# Patient Record
Sex: FEMALE | Race: WHITE | Employment: UNEMPLOYED | URBAN - METROPOLITAN AREA
[De-identification: names, ages, dates, MRNs, and addresses within clinical notes are randomized per-mention and may not be internally consistent; named-entity substitution may affect disease eponyms.]

---
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
## 2025-01-19 DIAGNOSIS — F33.41 MAJOR DEPRESSIVE DISORDER, RECURRENT, IN PARTIAL REMISSION: ICD-10-CM

## 2025-01-20 RX ORDER — SERTRALINE HYDROCHLORIDE 100 MG/1
100 TABLET, FILM COATED ORAL DAILY
Qty: 30 TABLET | Refills: 0 | Status: SHIPPED | OUTPATIENT
Start: 2025-01-20

## 2025-01-20 NOTE — TELEPHONE ENCOUNTER
"Date of Last Office Visit: 7/31/24  RTC: thanksgiving  No shows: 0  Cancellations: 0  Date of Next Office Visit: 0  ------------------------------    Medication requested:    sertraline (ZOLOFT) 100 MG tablet 90 tablet 0 10/28/2024     sertraline (ZOLOFT) 50 MG tablet 90 tablet 0 10/28/2024     ------------------------------  From last visit note:   \"Continue sertraline 150 mg to target anxiety and depression. \"    Lapse in medication adherence greater than 5 days?:  no  Medication refill request verified as identical to current order?: yes    Refill Decision:  30 day catrachito refill sent to the pharmacy - including instructions for patient to call the clinic and schedule an appointment.  Scheduling has been notified to contact the pt for appointment.    '        "

## 2025-01-26 NOTE — PROGRESS NOTES
Virtual Visit Details    Type of service:  Video Visit     Originating Location (pt. Location): Home    Distant Location (provider location):  On-site  Platform used for Video Visit: Innography    Video Start Time:  102pm    Video End Time: 134 pm    Documentation and script writin min    55 min spent on the date of the encounter in chart review, patient visit, review of tests, documentation, care coordination, and/or discussion with other providers about the issues documented above.        Psychiatry Clinic Progress Note                                                                   Ghada Cohen is a 19 year old female.  Therapist: Marilou Womack PhD  PCP: Gustabo Barber  Other Providers: None    Pertinent Background:  See previous notes.  Psych critical item history includes SIB, hospitalization X1, suicidal ideation.     Interim History                                                                                                        4, 4   Last seen on 24      Psychiatric meds:  Sertraline 150 mg/day  Benadryl 50 mg q hs  Hydroxyzine 5-10 mg prn anxiety    Ghada is a freshman at [employer]. She is on the diving team.  She is doing great.  She loves her school, had made friends who are both on and off the diving team.  She gets along very well with her roommate.    Mother reports that she has observed Ghada's mood is be good.  She is sleeping from 1030 pm to 530 am or 830 am (depending on whether she has diving practice). Her appetite , energy and concentration have been good.  She has not reported any suicidal ideation.    Anxiety under good control with sertraline.  Ghada has fear of flying that is managed with 10 mg hydroxyzine.    Diving has been enjoyable.  It is a big time commitment with 9 practices per week during the dive season.    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    All family members were ill with  "norovirus during winter break trip to [place].      Medical: Ghada receives infusions of saline 2 X/month for POTS at local hospital.  She takes 2 medications for POTS (metoprolol 37.5 mg, and midodrine 5 mg BID from Dr. Odell at [facility] cardiology).  This treatment plan has been helpful.    SEs:  None reported    Recent Symptoms:   Elevated:  none  Psychosis:  none   Anxiety: HPI  Depression: none     Recent Substance Use:  none reported        Social/ Family History                                  [per patient report]                                 1ea,1ea   Lives with parents and 2 younger sisters, great student.  Gets all As.   She loves alpine skiing.  Ghada loves animals and enjoys taking care of her pets.   Activities have included boxing, cello, biking. Parents are very supportive.       Medical / Surgical History                                                                                                                  Patient Active Problem List   Diagnosis    POTS (postural orthostatic tachycardia syndrome)       No past surgical history on file.     Medical Review of Systems                                                                                                    2,10   Comprehensive medical ROS was performed and was negative with the exception of what is in HPI.  Allergy                                Patient has no known allergies.  Current Medications                                                                                                       Current Outpatient Medications   Medication Sig Dispense Refill    diphenhydrAMINE (BENADRYL) 50 MG capsule Take 50 mg by mouth At Bedtime      Emergency Supply Kit, PIV, Patient use for emergency only. Contents: 3 sodium chloride 0.9% flushes, 1 IV start kit, 1 microclave ext set 14\", 1 each IV Cath 22 G/1\" and 24G/3/4\", 6 alcohol prep pads, 4 nitrile gloves (med). Call [phone] to reorder. 770213 kit 0    hydrOXYzine HCl " (ATARAX) 10 MG tablet Take 1-2 tablets (10-20 mg) by mouth daily as needed for anxiety 40 tablet 0    lactated ringers in 1,000 mL via gravity infusion Infuse over 2-4 hours into the vein once a week. Infuse 1-2 bag(s) (5421-9836 mL). Each bag to infuse over 2-4 hours. 886621 mL 0    melatonin 3 MG tablet Take 1 tab (3 mg) by mouth at bedtime 60 tablet 0    metoprolol succinate ER (TOPROL XL) 25 MG 24 hr tablet Take 1.5 tablets (37.5 mg) by mouth daily. 45 tablet 0    metoprolol succinate ER (TOPROL XL) 25 MG 24 hr tablet Take 1.5 tablets (37.5 mg) by mouth daily. 135 tablet 0    midodrine (PROAMATINE) 2.5 MG tablet Take 2 tablets (5 mg) by mouth 2 times daily. 360 tablet 1    MARY ANNE 0.25-35 MG-MCG tablet Take 1 tablet by mouth daily at 2 pm      sertraline (ZOLOFT) 100 MG tablet Take 1 tablet (100 mg) by mouth daily. WITH ONE 50 MG TAB FOR TOTAL DAILY DOSE  MG For more refills,schedule an appointment at [phone] 30 tablet 0    sertraline (ZOLOFT) 50 MG tablet Take 1 tablet (50 mg) by mouth daily. WITH  MG TAB FOR TOTAL DOSE  MG For more refills,schedule an appointment at [phone] 30 tablet 0    sodium chloride, PF, 0.9% PF flush Inject 10 mLs into the vein as needed for line flush. Flush IV before and after medication administration as directed and/or at least every 12 hours. [id number] mL 0     Vitals                                                                                                                       3, 3   There were no vitals taken for this visit.   Mental Status Exam                                                                                    9, 14 cog gs   NA   Alertness: alert  and oriented  Appearance: well groomed    Behavior/Demeanor: cooperative and pleasant, with good  eye contact   Speech: normal  Language: intact  Psychomotor: normal or unremarkable  Mood: depressed  Affect: full range; was congruent to mood; was congruent to content  Thought  Process/Associations: unremarkable  Thought Content:  Denies SI    Perception:  Reports none;    Insight: good  Judgment: good  Cognition: (6) does  appear grossly intact; formal cognitive testing was not done  Gait/Station and/or Muscle Strength/Tone: unremarkable    Labs and Data                                                                                                                 Rating Scales:    N/A    Diagnosis and Assessment                                                                             m2, h3   Ghada has made an excellent adjustment to college.  Today the following issues were addressed:    1) MDD  Recurrent in remission   2) JESUSITA with panic attacks in remission  3) POTS - followed by Dr. Odell in peds cardiology    [place] Prescription Monitoring Program [] review was not needed today.        Plan                                                                                                                    m2, h3      1) & 2) Continue sertraline 150 mg to target anxiety and depression.    Hydroxyzine 5-10 mg PRN anxiety - takes this ~once a mon      RTC: summer 2025    CRISIS NUMBERS:   Provided routinely in AVS.    Treatment Risk Statement:  The patient understands the risks, benefits, adverse effects and alternatives. Agrees to treatment with t      The longitudinal plan of care for the diagnosis(es)/condition(s) as documented were addressed during this visit. Due to the added complexity in care, I will continue to support Ghada in the subsequent management and with ongoing continuity of care.          Psychiatry Clinic Individual Psychotherapy Note                                                                     [16]     Reviewed the treatment plan with Ghada and her father who agree with the plan on 3/29/23.    Start time -   NA  End time - NA  Date last reviewed - 3/29/23      Date next due - 4/29/23    Subjective: This supportive psychotherapy session addressed issues related  to assessment of symptoms and functioning, review of recent medical issues and plans for college.  Patient's reaction: good in the context of mental status appropriate for ambulatory setting.  Progress: good  Psychotherapy services during this visit included myself and the patient.   Treatment Plan      SYMPTOMS; PROBLEMS   MEASURABLE GOALS;    FUNCTIONAL IMPROVEMENT INTERVENTIONS;   GAINS MADE DISCHARGE CRITERIA   Anxiety: excessive worry   reduce anxiety symptoms medications - sertraline  monitoring of anxiety  psycho-education  marked symptom improvement   Depression: depressed mood, low energy, insomnia and irritability   Maintain remission of depression symptoms. medications sertraline  monitoring of depressive symptoms  psycho-education  marked symptom improvement     PROVIDER:  Nataly Machado MD

## 2025-01-27 ENCOUNTER — VIRTUAL VISIT (OUTPATIENT)
Dept: PSYCHIATRY | Facility: CLINIC | Age: 20
End: 2025-01-27
Payer: COMMERCIAL

## 2025-01-27 VITALS — WEIGHT: 150 LBS | BODY MASS INDEX: 23.54 KG/M2 | HEIGHT: 67 IN

## 2025-01-27 DIAGNOSIS — F41.1 GENERALIZED ANXIETY DISORDER: ICD-10-CM

## 2025-01-27 DIAGNOSIS — F33.42 MAJOR DEPRESSIVE DISORDER, RECURRENT, IN FULL REMISSION: Primary | ICD-10-CM

## 2025-01-27 DIAGNOSIS — Z71.0 COUNSELING FOR CONCERN ABOUT BEHAVIOR OF CHILD: ICD-10-CM

## 2025-01-27 RX ORDER — SERTRALINE HYDROCHLORIDE 100 MG/1
100 TABLET, FILM COATED ORAL DAILY
Qty: 90 TABLET | Refills: 1 | Status: SHIPPED | OUTPATIENT
Start: 2025-01-27

## 2025-01-27 ASSESSMENT — PAIN SCALES - GENERAL: PAINLEVEL_OUTOF10: NO PAIN (0)

## 2025-01-27 NOTE — NURSING NOTE
Current patient location:  at School    Is the patient currently in the state of [place]? NO    Visit mode: VIDEO    If the visit is dropped, the patient can be reconnected by:VIDEO VISIT: Text to cell phone:   Telephone Information:   Mobile [phone]       Will anyone else be joining the visit? NO  (If patient encounters technical issues they should call [phone] :[id number])    Are changes needed to the allergy or medication list? No    Are refills needed on medications prescribed by this physician? YES    Rooming Documentation:  Questionnaire(s) completed    Reason for visit: RECHECK    Breanna SALDANA

## 2025-03-12 ENCOUNTER — VIRTUAL VISIT (OUTPATIENT)
Dept: PEDIATRIC CARDIOLOGY | Facility: CLINIC | Age: 20
End: 2025-03-12
Payer: COMMERCIAL

## 2025-03-12 VITALS — WEIGHT: 150 LBS | BODY MASS INDEX: 23.54 KG/M2 | HEIGHT: 67 IN

## 2025-03-12 DIAGNOSIS — G90.A POTS (POSTURAL ORTHOSTATIC TACHYCARDIA SYNDROME): Primary | ICD-10-CM

## 2025-03-12 PROCEDURE — 1126F AMNT PAIN NOTED NONE PRSNT: CPT | Mod: 95 | Performed by: PEDIATRICS

## 2025-03-12 PROCEDURE — 98006 SYNCH AUDIO-VIDEO EST MOD 30: CPT | Performed by: PEDIATRICS

## 2025-03-12 ASSESSMENT — PAIN SCALES - GENERAL: PAINLEVEL_OUTOF10: NO PAIN (0)

## 2025-03-12 ASSESSMENT — PATIENT HEALTH QUESTIONNAIRE - PHQ9: SUM OF ALL RESPONSES TO PHQ QUESTIONS 1-9: 0

## 2025-03-12 NOTE — PATIENT INSTRUCTIONS
[facility]   Pediatric Specialty Clinic [place]      Pediatric Call Center Scheduling and Nurse Questions:  [phone]    After hours urgent matters that cannot wait until the next business day:  [phone].  Ask for the on-call pediatric doctor for the specialty you are calling for be paged.      Prescription Renewals:  Please call your pharmacy first.  Your pharmacy must fax requests to [phone].  Please allow 2-3 days for prescriptions to be authorized.    If your physician has ordered a CT or MRI, you may schedule this test by calling [facility] in [place] at [phone].        **If your child is having a sedated procedure, they will need a history and physical done at their Primary Care Provider within 30 days of the procedure.  If your child was seen by the ordering provider in our office within 30 days of the procedure, their visit summary will work for the H&P unless they inform you otherwise.  If you have any questions, please call the RN Care Coordinator.**

## 2025-03-12 NOTE — NURSING NOTE
Ghada Cohen complains of  POTS    Patient would like the video invitation sent by: Text to cell phone: [phone]     Patient is located in [place]? Yes     I have reviewed and updated the patient's medication list, allergies and preferred pharmacy.      Henna Bagley LPN

## 2025-03-12 NOTE — Clinical Note
3/12/2025      RE: Ghada Cohen  [address] [address]     Dear Colleague,    Thank you for the opportunity to participate in the care of your patient, Ghada Cohen, at the [facility]. Please see a copy of my visit note below.    No notes on file    Please do not hesitate to contact me if you have any questions/concerns.     Sincerely,       Bautista Odell MD

## 2025-03-12 NOTE — PROGRESS NOTES
"Pediatric Cardiology Virtual Visit    Patient:  Ghada Cohen MRN:  [id number]   YOB: 2005 Age:  19 year old   Date of Visit:  3/12/2025 PCP:  Gustabo Barber MD     Dear Dr. Barber:    I had the pleasure of seeing Ghada Cohen by virtual visit from the [facility] in [place] on 3/12/2025 in ongoing consultation for POTS. Today's history obtained from Ghada. I last saw her in 11/2024.    Increased metoprolol XR to 50mg -- this helped a lot for overall \"feeling good.\" Still getting IV fluids about every 2-3 weeks, feels it really wear off around the 2-week anita. Midodrine 2-3 times/day, notices when it wears off in the afternoon.     Doing \"actually pretty good.\" Good energy level. Sleep consistently ~9 hours uninterrupted. Dive season just finished, able to participate the whole season. Able to keep up exercise most days after season ended.     Main residual symptom is nausea, sometimes to the point where she vomits, perhaps every other week. Tends to be an hour after eating, more towards the end of the day. More associated with standing than supine.     Past medical history: No past medical history on file. As above. I reviewed Ghada Cohen's medical records.    She has a current medication list which includes the following prescription(s): diphenhydramine, emergency supply kit (piv), hydroxyzine hcl, lactated ringers in 1,000 mL via gravity infusion, melatonin, metoprolol succinate er, midodrine, cony, sertraline, sertraline, sodium chloride (pf), and metoprolol succinate er. She has No Known Allergies.    Family and Social History:  unchanged    The Review of Systems is negative other than noted in the HPI.    Physical Examination:  Ht 1.702 m (5' 7\")   Wt 68 kg (150 lb)   BMI 23.49 kg/m    GENERAL: alert and no distress  EYES: Eyes grossly normal to inspection.  No discharge or erythema, or obvious scleral/conjunctival " abnormalities.  RESP: No audible wheeze, cough, or visible cyanosis.    SKIN: Visible skin clear. No significant rash, abnormal pigmentation or lesions.  NEURO: Cranial nerves grossly intact.  Mentation and speech appropriate for age.  PSYCH: Appropriate affect, tone, and pace of words      Assessment and Plan: Ghada is a 19 year old female with POTS, under improved control of symptoms. No changes. Reviewed my low concern for MCAS. Reviewed other options besides prn ondansetron for episodic nausea, including cj and median nerve neuromodulation strategies. Follow-up 8/2025 with no tests.    Thank you for the opportunity to follow Ghada with you. Please don't hesitate to contact me with questions or concerns.    Bautista Odell MD  Pediatric Cardiology  [facility] [place] [address]  Phone [phone]  Fax [phone]    Total Time: 25min  This includes time spent in review of records and results, obtaining direct clinical information, counseling, and coordination of care for today's office visit.    Prescription drug management

## 2025-03-13 ENCOUNTER — HOME INFUSION (OUTPATIENT)
Dept: HOME HEALTH SERVICES | Facility: HOME HEALTH | Age: 20
End: 2025-03-13
Payer: COMMERCIAL

## 2025-03-18 ENCOUNTER — HOME INFUSION BILLING (OUTPATIENT)
Dept: HOME HEALTH SERVICES | Facility: HOME HEALTH | Age: 20
End: 2025-03-18
Payer: COMMERCIAL

## 2025-03-18 PROCEDURE — S1015 IV TUBING EXTENSION SET: HCPCS

## 2025-03-20 ENCOUNTER — HOME CARE VISIT (OUTPATIENT)
Dept: HOME HEALTH SERVICES | Facility: HOME HEALTH | Age: 20
End: 2025-03-20
Payer: COMMERCIAL

## 2025-03-20 VITALS
TEMPERATURE: 97.5 F | RESPIRATION RATE: 14 BRPM | SYSTOLIC BLOOD PRESSURE: 110 MMHG | DIASTOLIC BLOOD PRESSURE: 64 MMHG | OXYGEN SATURATION: 98 % | HEART RATE: 91 BPM

## 2025-03-20 PROCEDURE — S9374 HIT HYDRA 1 LITER DIEM: HCPCS

## 2025-03-20 NOTE — PROGRESS NOTES
Nursing Visit Note:  Nurse visit today for PIV/hydration for Ghada Cohen.     present during visit today: Not Applicable.    Intravenous Access:  Peripheral IV placed.    See below     Note: Pt is home from college. Pt reports wisdom teeth removal with mild pain. Pt reports some dizziness but staying hydrated.   Pt received 1000 of LR today.   Pt will remove her own IV when bolts is finished.       Saline administered: 10 with flush (ml)    Supply Check:   Does the patient have all the supplies they need for the next visit?  Yes    Next visit plan: Pt will call when home from school.     Ludmila Ross, RN 3/20/2025

## 2025-04-02 ENCOUNTER — MYC REFILL (OUTPATIENT)
Dept: PEDIATRIC CARDIOLOGY | Facility: CLINIC | Age: 20
End: 2025-04-02
Payer: COMMERCIAL

## 2025-04-02 DIAGNOSIS — G90.A POTS (POSTURAL ORTHOSTATIC TACHYCARDIA SYNDROME): ICD-10-CM

## 2025-04-02 RX ORDER — METOPROLOL SUCCINATE 25 MG/1
50 TABLET, EXTENDED RELEASE ORAL DAILY
Qty: 60 TABLET | Refills: 4 | Status: SHIPPED | OUTPATIENT
Start: 2025-04-02

## 2025-04-02 NOTE — TELEPHONE ENCOUNTER
Patient last seen by Dr. Odell on 3/12/2025, scheduled next on 8/27/2025.  Refilled per nursing protocol.

## 2025-04-23 ENCOUNTER — MYC REFILL (OUTPATIENT)
Dept: PSYCHIATRY | Facility: CLINIC | Age: 20
End: 2025-04-23
Payer: COMMERCIAL

## 2025-04-23 RX ORDER — SERTRALINE HYDROCHLORIDE 100 MG/1
100 TABLET, FILM COATED ORAL DAILY
Qty: 90 TABLET | Refills: 1 | Status: CANCELLED | OUTPATIENT
Start: 2025-04-23

## 2025-05-01 ENCOUNTER — MYC REFILL (OUTPATIENT)
Dept: PEDIATRIC CARDIOLOGY | Facility: CLINIC | Age: 20
End: 2025-05-01
Payer: COMMERCIAL

## 2025-05-01 DIAGNOSIS — G90.A POTS (POSTURAL ORTHOSTATIC TACHYCARDIA SYNDROME): ICD-10-CM

## 2025-05-01 RX ORDER — MIDODRINE HYDROCHLORIDE 2.5 MG/1
5 TABLET ORAL 2 TIMES DAILY
Qty: 360 TABLET | Refills: 1 | Status: SHIPPED | OUTPATIENT
Start: 2025-05-01

## 2025-05-01 NOTE — TELEPHONE ENCOUNTER
Patient last saw Dr. Odell on 3/12/25, was told to follow-up in 5 months. Next appointment 8/27/25.       This is a faxed refill request for midodrine (PROAMATINE) 2.5 MG tablet from [facility].     Refilled per cardiology nursing protocol.

## 2025-06-01 ENCOUNTER — MYC REFILL (OUTPATIENT)
Dept: PSYCHIATRY | Facility: CLINIC | Age: 20
End: 2025-06-01
Payer: COMMERCIAL

## 2025-06-01 DIAGNOSIS — F33.42 MAJOR DEPRESSIVE DISORDER, RECURRENT, IN FULL REMISSION: Primary | ICD-10-CM

## 2025-06-01 DIAGNOSIS — F41.1 GENERALIZED ANXIETY DISORDER: ICD-10-CM

## 2025-06-01 RX ORDER — SERTRALINE HYDROCHLORIDE 100 MG/1
100 TABLET, FILM COATED ORAL DAILY
Qty: 90 TABLET | Refills: 1 | Status: CANCELLED | OUTPATIENT
Start: 2025-06-01

## 2025-06-02 ENCOUNTER — HOME INFUSION BILLING (OUTPATIENT)
Dept: HOME HEALTH SERVICES | Facility: HOME HEALTH | Age: 20
End: 2025-06-02
Payer: COMMERCIAL

## 2025-06-02 ENCOUNTER — TELEPHONE (OUTPATIENT)
Dept: HOME HEALTH SERVICES | Facility: HOME HEALTH | Age: 20
End: 2025-06-02
Payer: COMMERCIAL

## 2025-06-02 PROCEDURE — S1015 IV TUBING EXTENSION SET: HCPCS

## 2025-06-02 NOTE — TELEPHONE ENCOUNTER
Last seen: 1/27  RTC: summer 2025  Cancel: none  No-show: none  Next appt: none scheduled      Incoming refill from parent via MyChart     sertraline (ZOLOFT) 50 MG tablet 90 tablet 1 1/27/2025 -- No   Sig - Route: Take 1 tablet (50 mg) by mouth daily. WITH  MG TAB FOR TOTAL DOSE  MG - Oral   Last refilled: 2/4 for 90 d/s     sertraline (ZOLOFT) 100 MG tablet 90 tablet 1 1/27/2025 -- No   Sig - Route: Take 1 tablet (100 mg) by mouth daily. WITH ONE 50 MG TAB FOR TOTAL DAILY DOSE  MG - Oral   Last refilled: 2/4 for 90 d/s

## 2025-06-02 NOTE — TELEPHONE ENCOUNTER
received a vm from Dad, Brayden, to schedule visit for pt this week.  Franchescar called father and call was disconnected.   tried Ghada and visit scheduled for Wednesday, pt available anytime.

## 2025-06-04 ENCOUNTER — HOME CARE VISIT (OUTPATIENT)
Dept: HOME HEALTH SERVICES | Facility: HOME HEALTH | Age: 20
End: 2025-06-04
Payer: COMMERCIAL

## 2025-06-04 VITALS
HEART RATE: 89 BPM | RESPIRATION RATE: 16 BRPM | SYSTOLIC BLOOD PRESSURE: 110 MMHG | BODY MASS INDEX: 24.28 KG/M2 | WEIGHT: 155 LBS | OXYGEN SATURATION: 98 % | TEMPERATURE: 97.5 F | DIASTOLIC BLOOD PRESSURE: 76 MMHG

## 2025-06-04 PROCEDURE — 99601 HOME NFS VISIT <2 HRS: CPT

## 2025-06-04 PROCEDURE — S9374 HIT HYDRA 1 LITER DIEM: HCPCS

## 2025-06-04 RX ORDER — SERTRALINE HYDROCHLORIDE 100 MG/1
100 TABLET, FILM COATED ORAL DAILY
Qty: 90 TABLET | Refills: 0 | Status: SHIPPED | OUTPATIENT
Start: 2025-06-04

## 2025-06-04 NOTE — PROGRESS NOTES
Nursing Visit Note:  Nurse visit today for PIV, Fluids for Ghada Cohen.     present during visit today: Not Applicable.    Intravenous Access:  Peripheral IV placed.    LR 2000 mL    Note: Pt having constipation issues, working through GI scheduled system    Saline administered: 10 (ml)    Supply Check:   Does the patient have all the supplies they need for the next visit?  Yes    Next visit plan: NA, next visit in clinic due to summer camp    Maggi Castro RN 6/4/2025

## 2025-08-04 ENCOUNTER — HOME INFUSION (OUTPATIENT)
Dept: HOME HEALTH SERVICES | Facility: HOME HEALTH | Age: 20
End: 2025-08-04
Payer: COMMERCIAL

## 2025-08-11 ENCOUNTER — HOME INFUSION BILLING (OUTPATIENT)
Dept: HOME HEALTH SERVICES | Facility: HOME HEALTH | Age: 20
End: 2025-08-11
Payer: COMMERCIAL

## 2025-08-13 ENCOUNTER — HOME CARE VISIT (OUTPATIENT)
Dept: HOME HEALTH SERVICES | Facility: HOME HEALTH | Age: 20
End: 2025-08-13
Payer: COMMERCIAL

## 2025-08-13 VITALS
HEIGHT: 68 IN | HEART RATE: 75 BPM | TEMPERATURE: 97.7 F | SYSTOLIC BLOOD PRESSURE: 105 MMHG | RESPIRATION RATE: 18 BRPM | OXYGEN SATURATION: 97 % | WEIGHT: 158 LBS | DIASTOLIC BLOOD PRESSURE: 65 MMHG | BODY MASS INDEX: 23.95 KG/M2

## 2025-08-13 PROCEDURE — S9374 HIT HYDRA 1 LITER DIEM: HCPCS

## 2025-08-13 PROCEDURE — 99601 HOME NFS VISIT <2 HRS: CPT

## 2025-08-22 ENCOUNTER — HOME INFUSION (OUTPATIENT)
Dept: HOME HEALTH SERVICES | Facility: HOME HEALTH | Age: 20
End: 2025-08-22
Payer: COMMERCIAL

## 2025-08-26 DIAGNOSIS — G90.A POTS (POSTURAL ORTHOSTATIC TACHYCARDIA SYNDROME): ICD-10-CM

## 2025-08-30 DIAGNOSIS — F33.42 MAJOR DEPRESSIVE DISORDER, RECURRENT, IN FULL REMISSION: ICD-10-CM

## 2025-08-30 DIAGNOSIS — F41.1 GENERALIZED ANXIETY DISORDER: ICD-10-CM

## 2025-09-01 DIAGNOSIS — F33.42 MAJOR DEPRESSIVE DISORDER, RECURRENT, IN FULL REMISSION: ICD-10-CM

## 2025-09-01 DIAGNOSIS — F41.1 GENERALIZED ANXIETY DISORDER: ICD-10-CM

## 2025-09-02 ENCOUNTER — HOME INFUSION BILLING (OUTPATIENT)
Dept: HOME HEALTH SERVICES | Facility: HOME HEALTH | Age: 20
End: 2025-09-02
Payer: COMMERCIAL

## 2025-09-02 RX ORDER — SERTRALINE HYDROCHLORIDE 100 MG/1
100 TABLET, FILM COATED ORAL DAILY
Qty: 30 TABLET | Refills: 0 | Status: SHIPPED | OUTPATIENT
Start: 2025-09-02

## 2025-09-03 ENCOUNTER — HOME CARE VISIT (OUTPATIENT)
Dept: HOME HEALTH SERVICES | Facility: HOME HEALTH | Age: 20
End: 2025-09-03
Payer: COMMERCIAL

## 2025-09-03 VITALS
HEART RATE: 81 BPM | BODY MASS INDEX: 23.57 KG/M2 | DIASTOLIC BLOOD PRESSURE: 62 MMHG | RESPIRATION RATE: 20 BRPM | OXYGEN SATURATION: 96 % | TEMPERATURE: 98.7 F | SYSTOLIC BLOOD PRESSURE: 102 MMHG | WEIGHT: 155 LBS

## 2025-09-03 PROCEDURE — S9374 HIT HYDRA 1 LITER DIEM: HCPCS
